# Patient Record
Sex: MALE | Race: WHITE | NOT HISPANIC OR LATINO | Employment: FULL TIME | URBAN - METROPOLITAN AREA
[De-identification: names, ages, dates, MRNs, and addresses within clinical notes are randomized per-mention and may not be internally consistent; named-entity substitution may affect disease eponyms.]

---

## 2017-06-02 ENCOUNTER — OFFICE VISIT (OUTPATIENT)
Dept: INTERNAL MEDICINE | Facility: CLINIC | Age: 27
End: 2017-06-02

## 2017-06-02 VITALS
OXYGEN SATURATION: 99 % | DIASTOLIC BLOOD PRESSURE: 80 MMHG | SYSTOLIC BLOOD PRESSURE: 124 MMHG | HEIGHT: 71 IN | WEIGHT: 163 LBS | BODY MASS INDEX: 22.82 KG/M2 | HEART RATE: 104 BPM

## 2017-06-02 DIAGNOSIS — F32.1 MODERATE SINGLE CURRENT EPISODE OF MAJOR DEPRESSIVE DISORDER (HCC): Primary | ICD-10-CM

## 2017-06-02 PROCEDURE — 99203 OFFICE O/P NEW LOW 30 MIN: CPT | Performed by: NURSE PRACTITIONER

## 2017-06-02 RX ORDER — FLUOXETINE HYDROCHLORIDE 20 MG/1
20 CAPSULE ORAL DAILY
Qty: 30 CAPSULE | Refills: 6 | Status: SHIPPED | OUTPATIENT
Start: 2017-06-02

## 2017-06-02 NOTE — PROGRESS NOTES
"Subjective  Establish Care (needing new PCP, general check-up, depression )      Orion Jackson is a 26 y.o. male.   No Known Allergies  History of Present Illness      Depression x 4 years, 1 year ago he got worse, has starting secluding self, caused his divorce , not sleeping well, sporadic irritability, has never been on anything in the past , has had suicidal thoughts w/o a plan   The following portions of the patient's history were reviewed and updated as appropriate: allergies, current medications, past family history, past medical history, past social history, past surgical history and problem list.    Review of Systems   Psychiatric/Behavioral: Positive for agitation, behavioral problems, dysphoric mood, sleep disturbance and suicidal ideas. The patient is nervous/anxious.    All other systems reviewed and are negative.      Objective   Physical Exam   Constitutional: He is oriented to person, place, and time. He appears well-developed and well-nourished. No distress.   HENT:   Head: Normocephalic.   Eyes: Conjunctivae and EOM are normal. Pupils are equal, round, and reactive to light.   Neck: Normal range of motion. Neck supple.   Cardiovascular: Normal rate, regular rhythm and normal heart sounds.    No murmur heard.  Pulmonary/Chest: Effort normal and breath sounds normal.   Musculoskeletal: Normal range of motion.   Neurological: He is alert and oriented to person, place, and time.   Skin: Skin is warm and dry. No rash noted. He is not diaphoretic.   Psychiatric: His speech is normal. Judgment and thought content normal. His affect is not inappropriate. He is withdrawn. He is not actively hallucinating. Cognition and memory are normal. He exhibits a depressed mood.   Nursing note and vitals reviewed.    /80  Pulse 104  Ht 71\" (180.3 cm)  Wt 163 lb (73.9 kg)  SpO2 99%  BMI 22.73 kg/m2    Assessment/Plan     Problem List Items Addressed This Visit     None      Visit Diagnoses     Moderate " single current episode of major depressive disorder    -  Primary    Relevant Medications    FLUoxetine (PROZAC) 20 MG capsule        Will start prozac, recommended [facility] counseling rtc 4 weeks